# Patient Record
Sex: MALE | Race: OTHER
[De-identification: names, ages, dates, MRNs, and addresses within clinical notes are randomized per-mention and may not be internally consistent; named-entity substitution may affect disease eponyms.]

---

## 2018-08-21 ENCOUNTER — HOSPITAL ENCOUNTER (EMERGENCY)
Dept: HOSPITAL 61 - ER | Age: 33
Discharge: HOME | End: 2018-08-21
Payer: SELF-PAY

## 2018-08-21 VITALS — WEIGHT: 185 LBS | HEIGHT: 66 IN | BODY MASS INDEX: 29.73 KG/M2

## 2018-08-21 VITALS — SYSTOLIC BLOOD PRESSURE: 177 MMHG | DIASTOLIC BLOOD PRESSURE: 106 MMHG

## 2018-08-21 DIAGNOSIS — F17.210: ICD-10-CM
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
## 2018-08-21 DIAGNOSIS — J45.909: ICD-10-CM
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
## 2018-08-21 DIAGNOSIS — Z88.0: ICD-10-CM

## 2018-08-21 DIAGNOSIS — S62.302A: Primary | ICD-10-CM

## 2018-08-21 DIAGNOSIS — I10: ICD-10-CM
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
## 2018-08-21 PROCEDURE — 73130 X-RAY EXAM OF HAND: CPT

## 2018-08-21 PROCEDURE — 99284 EMERGENCY DEPT VISIT MOD MDM: CPT

## 2018-08-21 NOTE — PHYS DOC
Past Medical History


Past Medical History:  Asthma, Hypertension


Past Surgical History:  No Surgical History


Additional Information:  


2 CIGARETTES PER DAY


Alcohol Use:  Occasionally


Drug Use:  None





Adult General


Chief Complaint


Chief Complaint:  HAND PROBLEM





HPI


HPI





Patient is a 33  year old male who presents with pain and swelling to the right 

hand. The patient states that he had what he calls spider bite on his right 

forearm approximately one week ago. That bite has now healed but he is still 

having swelling to the hand. He has bruising to the bottom of his hand but 

denies known injury. He denies fever, nausea or vomiting. He denies any other 

injury.





Review of Systems


Review of Systems





Constitutional: Denies fever or chills []


Respiratory: Denies cough or shortness of breath []


Cardiovascular: No additional information not addressed in HPI []


GI: Denies abdominal pain, nausea, vomiting, bloody stools or diarrhea []


: Denies dysuria or hematuria []


Musculoskeletal: See history of present illness


Integument: See history of present illness


Neurologic: Denies headache, focal weakness or sensory changes []


Endocrine: Denies polyuria or polydipsia []





All other systems were reviewed and found to be within normal limits, except as 

documented in this note.





Allergies


Allergies





Allergies








Coded Allergies Type Severity Reaction Last Updated Verified


 


  Penicillins Allergy Intermediate EYES SWELLING 8/21/18 Yes











Physical Exam


Physical Exam





Constitutional: Well developed, well nourished, no acute distress, non-toxic 

appearance. []


Cardiovascular:Heart rate regular rhythm, no murmur []


Lungs & Thorax:  Bilateral breath sounds clear to auscultation []


Abdomen: Bowel sounds normal, soft, no tenderness, no masses, no pulsatile 

masses. [] 


Skin: 0.5 cm pink healing lesion noted to right forearm


Back: No tenderness, no CVA tenderness. [] 


Extremities: Mild edema to right hand with no calor or erythema, range of 

motion is intact, 2 cm in diameter bruise noted to the palmar aspect of the 

right hand over the third metacarpal, no gross deformity noted


Neurologic: Alert and oriented X 3, normal motor function, normal sensory 

function, no focal deficits noted. []


Psychologic: Affect normal, judgement normal, mood normal. []





Current Patient Data


Vital Signs





 Vital Signs








  Date Time  Temp Pulse Resp B/P (MAP) Pulse Ox O2 Delivery O2 Flow Rate FiO2


 


8/21/18 21:08 98.5 98 16 177/106 (129) 98 Room Air  





 98.5       











EKG


EKG


[]





Radiology/Procedures


Radiology/Procedures


[]





Course & Med Decision Making


Course & Med Decision Making


Pertinent Labs and Imaging studies reviewed. (See chart for details)





[]





Dragon Disclaimer


Dragon Disclaimer


This electronic medical record was generated, in whole or in part, using a 

voice recognition dictation system.





Departure


Departure


Impression:  


 Primary Impression:  


 Metacarpal bone fracture


Disposition:  01 HOME, SELF-CARE


Condition:  STABLE


Referrals:  


ALVAREZ ASENCIO MD


Patient Instructions:  Hand Fracture, RICE - Routine Care for Injuries





Additional Instructions:  


RICE the extremity. Follow-up with orthopedics for reevaluation of your hand 

injury. If worsening return to the emergency department.











SAURAV CASTELLANOS Aug 21, 2018 21:23